# Patient Record
Sex: FEMALE | Race: WHITE | URBAN - METROPOLITAN AREA
[De-identification: names, ages, dates, MRNs, and addresses within clinical notes are randomized per-mention and may not be internally consistent; named-entity substitution may affect disease eponyms.]

---

## 2023-02-28 ENCOUNTER — HOME HEALTH ADMISSION (OUTPATIENT)
Dept: HOME HEALTH SERVICES | Facility: HOME HEALTH | Age: 88
End: 2023-02-28
Payer: MEDICARE

## 2023-03-06 ENCOUNTER — HOME CARE VISIT (OUTPATIENT)
Dept: SCHEDULING | Facility: HOME HEALTH | Age: 88
End: 2023-03-06

## 2023-03-06 VITALS
HEART RATE: 85 BPM | RESPIRATION RATE: 18 BRPM | DIASTOLIC BLOOD PRESSURE: 62 MMHG | OXYGEN SATURATION: 93 % | SYSTOLIC BLOOD PRESSURE: 105 MMHG | TEMPERATURE: 97.1 F

## 2023-03-06 PROCEDURE — G0151 HHCP-SERV OF PT,EA 15 MIN: HCPCS

## 2023-03-06 PROCEDURE — 0221000100 HH NO PAY CLAIM PROCEDURE

## 2023-03-06 ASSESSMENT — ENCOUNTER SYMPTOMS
DYSPNEA ACTIVITY LEVEL: AFTER AMBULATING 10 - 20 FT
PAIN LOCATION - PAIN QUALITY: ACHY

## 2023-03-07 ENCOUNTER — HOME CARE VISIT (OUTPATIENT)
Dept: SCHEDULING | Facility: HOME HEALTH | Age: 88
End: 2023-03-07

## 2023-03-07 PROCEDURE — G0152 HHCP-SERV OF OT,EA 15 MIN: HCPCS

## 2023-03-07 NOTE — HOME HEALTH
Pt is an 81 yo female who is a new resident at [facility]. Pt recently moved down from [place] about 2 weeks ago. Skilled PT intervention orders received. PMH includes Falls, HTN, GERD, RA, OA, insomnia. Pt was living in an [place] in [place] and recently moved approximately 2 weeks ago to [facility]. Pt's son and daughter n law live close by and are available to assist as needed. Pt was using a RW for short distances and transport wc for all mobility outside of pt's room. Pt presents now using a wc for all mobility, gneralized joint pain especially L shoulder, unsteady gait pattern, BLE muscle weakness, impaired dynamic balance and decreased cv endurance causing difficulty performing bed mobility, safe functional transfers from various surfaces throughout home, standing activities, household ambulation and facility ambulation with reduced assistance as able to do in PLOF. Pt is currently using a borrowed standard wc while she awaits hers that has already been ordered per caregiver. Pt will benefit from skilled PT intervention in order to improve functional deficits and establish a HEP to allow pt to perform daily functional tasks including ambulating to/from dining room, safely with supervision using her RW for support. PT reviewed POC, tx frequency, tx goals, safety precautions, fall prevention strategies, pain management, safe med management and energy conservation techniques.

## 2023-03-08 ENCOUNTER — HOME CARE VISIT (OUTPATIENT)
Dept: SCHEDULING | Facility: HOME HEALTH | Age: 88
End: 2023-03-08

## 2023-03-08 VITALS
HEART RATE: 76 BPM | OXYGEN SATURATION: 94 % | RESPIRATION RATE: 18 BRPM | DIASTOLIC BLOOD PRESSURE: 62 MMHG | TEMPERATURE: 97.3 F | SYSTOLIC BLOOD PRESSURE: 140 MMHG

## 2023-03-08 VITALS
HEART RATE: 76 BPM | OXYGEN SATURATION: 97 % | DIASTOLIC BLOOD PRESSURE: 60 MMHG | TEMPERATURE: 97.5 F | RESPIRATION RATE: 16 BRPM | SYSTOLIC BLOOD PRESSURE: 115 MMHG

## 2023-03-08 PROCEDURE — G0157 HHC PT ASSISTANT EA 15: HCPCS

## 2023-03-08 ASSESSMENT — ENCOUNTER SYMPTOMS: SNORING: 1

## 2023-03-08 NOTE — HOME HEALTH
Pt is an 81 yo female R handed, recently moved  from [place] to  [address] [place] [address].   PMH includes Falls, HTN, GERD, RA, OA, insomnia. DME: bed rail, grab bars in bathroom ( toilet and shower), 2 ww, shower chair , transport wc  PLOF : [place], ambulated short dinstance using RW and ling distance using transport wc, some assistance reqired with self care. Patient currently a resident of [address] [place] [address] with supportive family providing support and assistance. Pt reports generalized pain especially on L shoulder with movement above 45 degrees of flexion or abd. Pt presents with decreased BUE ROM limiting ability to reach to cabinets and own body parts. Pt requires asssitance with functional transfers and with self care especially activities in standing position due to poor standing tolerance and fair posture displaying round shoulders and posterior pelvic tilt. Pt presents with impaired [address] and [address] with B hand digits deformities interfering with object manipulation. Pt is alert and Ox3 , forgetful. -- Patient was educated in proper sequencing during bed and toilet transfers. She verbalized understanding, however, she requires continuation of training.   -- Pt could benefit from OT to address the above issues.

## 2023-03-08 NOTE — HOME HEALTH
Pt denies having any pain. In bed and states she is not doing good because she is a little tired. Agreeable to PT. Amb with FWW, CGA, 100ft with 1 turn. Presents with unequal steps, decrease heel strike, decrease step length. Vc given for correction to deviations to improve foot clearance and gait quality. Gait training to improve pt ability to navigate around [place] for meals and activities safely. Rolling side to side with Mod A, VC for reaching cross midline and crossing top LE over to facilitate roll. Supine <> sit with Max A and Max A for progression of BLE on and off bed. VC for rolling side lying before attempting to sit up. 10reps x 2 heel toe raises, hip flexion, LAQ, hip abd with manual resistance, and ham curls to improve muscle strength for transfers and gait training. VC for slow movements and reaching full range of motion. Exercise handout given to pt for HEP; pt verbalized understanding. STS transfer from pt WC with Mod A. VC for hand placement on WC arm rest for push off to prevent pt pulling on walker to get into standing. STS from bed with SBA, Vc for hand placement. Pt presents with muscle weakness with difficulty in completing bed mobility and transfers. Post tx pt reported she is too tired and did not want to do anymore. Will require continued training to improve gait endurance, gait quality, muscle strength, bed mobility, and carryover of each task to reach pt goals. Next session work on bed mobility, gait, strength, and transfers.

## 2023-03-13 ENCOUNTER — HOME CARE VISIT (OUTPATIENT)
Dept: SCHEDULING | Facility: HOME HEALTH | Age: 88
End: 2023-03-13

## 2023-03-13 VITALS
HEART RATE: 77 BPM | DIASTOLIC BLOOD PRESSURE: 65 MMHG | TEMPERATURE: 97.2 F | OXYGEN SATURATION: 93 % | RESPIRATION RATE: 18 BRPM | SYSTOLIC BLOOD PRESSURE: 118 MMHG

## 2023-03-13 VITALS
RESPIRATION RATE: 18 BRPM | HEART RATE: 89 BPM | SYSTOLIC BLOOD PRESSURE: 110 MMHG | OXYGEN SATURATION: 95 % | TEMPERATURE: 97.2 F | DIASTOLIC BLOOD PRESSURE: 62 MMHG

## 2023-03-13 PROCEDURE — G0157 HHC PT ASSISTANT EA 15: HCPCS

## 2023-03-13 PROCEDURE — G0152 HHCP-SERV OF OT,EA 15 MIN: HCPCS

## 2023-03-14 NOTE — HOME HEALTH
Pt denies having any current pain. Transfer training WC <> toilet with Min A, assistance with set-up and locking of WC brakes. VC for hand placement on WC armrest for push off and on handrail for safety when pivoting. Standing balance with shoulder width stance, reaching below pt waist, reaching cross midline with trunk rotations, and reaching behind with focus on dynamic and static stability for fall prevention. Gait training within pt room to improve safety navigating to and from bathroom, kitchen, and to pt bed, making 360 degree turns and navigating through doorways for fall prevention. Presents with unequal and inconsistent steps. requires some assistance for getting over ledge going into kitchen area due to unsteadiness and VC for slower pacing with turns for safety. Bed mobility sit > supine with Mod A and supine > sit with Max A; educated and instructed pt in proper techniques to improve pt independence with going into sidelying before getting in or out of bed. Instructed in proper hand placement while in sidelying to push off with R elbow and L hand to get into sitting and VC for LE progression off of bed. Pt able to make progress within session and progressed to [address] [place] [address] with task. Pt able to make progress within session with bed mobility and decrease in assistance level but will require continued training for carryover. No LOB with balance training and continued reminders for safety with transfers. Next session continue gait training in [address], bed mobility, transfers, and therex training to reach pt goals.

## 2023-03-14 NOTE — HOME HEALTH
Patient watching tv upon OT's arrival, pt reporte no pain at rest and some discomfort in B hand when holding objects. Pt in agreement to participate in functional transfer including toilet transfer with focus on proper sequencing . Pt was instructed in safety when lowering her self using BSC arm rest. Pt able to handle clothing indep  after toileting. Pt reported, she does not like the bed rail placed on the higher part of the bed, she likes it when it was placed on the lower part of the bed. Pt was educated in proper sequencing when getting in bed with focus on sitting close to the bed rail and sliding back using B UE and proper use of bed rail. Pt tolerate gentle UE ROM in supine . OT will continue with POC for imporved BUE ROM and strength with focus on B grasp strength and joint precaution. Pt to remain at the [facility] .

## 2023-03-16 ENCOUNTER — HOME CARE VISIT (OUTPATIENT)
Dept: SCHEDULING | Facility: HOME HEALTH | Age: 88
End: 2023-03-16

## 2023-03-16 VITALS
DIASTOLIC BLOOD PRESSURE: 70 MMHG | OXYGEN SATURATION: 98 % | SYSTOLIC BLOOD PRESSURE: 130 MMHG | RESPIRATION RATE: 18 BRPM | TEMPERATURE: 97.2 F | HEART RATE: 87 BPM

## 2023-03-16 PROCEDURE — G0157 HHC PT ASSISTANT EA 15: HCPCS

## 2023-03-17 NOTE — HOME HEALTH
Pt states she has a headache today 8/10, upon arrival pt laying in bed watching TV. Bed mobility supine <> sit with Max A, instructed in rolling into side lying in between for ease of transfers with pt having difficulty maintaining sidelying as she tries to sit up. Able to Progress BLE off bed and Min A for getting it onto the bed. Instructed in rolling side to side with pt initially stating she can't do it however instructed pt to reach LUE and LE over body when rolling to R side and vice versa. Pt able to follow cues and completed rolling with CGA. Getting in and out of bed into standing is hard for pt as bed is too high and pt begins to slide. Pt amb with FWW, 150ft with CGA. Presents with unequal steps and forward posture. VC for closer proximity to walker to improve posture. 12reps x 2 supine exercises this session as pt reports she is too tired. Instructed in ankle pumps, heel slides, single knee to chest, SAQ, bridging, and SLR. Rest between and VC for proper breathing with task. Pt continues to show difficulty getting in and out of bed requiring Max A this session with little carryover from previous session. Pt is at risk for falls as bed is too high and pt sliding off bed when trying to get into standing. Have notified pt son and spoke to staff regarding pt high bed and need for shorter bed to improve pt safety. Able to some improved gait quality this session with pt able to take bigger steps without cues however still unequal. Next session continue with bed mobility, gait training, and improving pt independence to decrease burden of care on caregivers.

## 2023-03-21 ENCOUNTER — HOME CARE VISIT (OUTPATIENT)
Dept: SCHEDULING | Facility: HOME HEALTH | Age: 88
End: 2023-03-21
Payer: MEDICARE

## 2023-03-21 VITALS
DIASTOLIC BLOOD PRESSURE: 88 MMHG | SYSTOLIC BLOOD PRESSURE: 136 MMHG | RESPIRATION RATE: 16 BRPM | OXYGEN SATURATION: 96 % | HEART RATE: 77 BPM | TEMPERATURE: 97.8 F

## 2023-03-21 PROCEDURE — G0157 HHC PT ASSISTANT EA 15: HCPCS

## 2023-03-21 NOTE — HOME HEALTH
Denies having any current pain. States she got a shorter bed mattress and it its working easier for her now. Bed mobility Supine<>sit with Mod A, VC for rolling to R side and holding bed rail to assist with turning however pt has difficulty with reaching with LLE for bed rail due to decrease in ROM of LUE. Pt able to progress BLE on and off bed without assistance. Sit<> stand from bed with Max A due to bed now being lower than previous. Vc for proper hand placement with RUE on bed rail and LUE on bed, VC for scooting to edge of bed and for foot placement and trunk flexion. Instructed in bridging x 5reps and instructed pt in how to reposition in bed to move up in bed. Instructed in first bridging and then shifting weight on one shoulder while progressing oppsite shoulder up and switching sides. STS from [facility] 5reps with CGA and progressing to SBA. VC for proper hand placement to decrease pulling from walker and for foot placement by increasing knee flexion. Pt did present with fatigue end of task requiring longer time to complete last 2 reps. Pt instructed in amb with FWW, SBA, 150ft x 2 and 1 seated rest. Presents with decrease in hip knee flexion and VC given for correction to deviations to improve foot clearance. Pt able to make progress with decrease in assistance level with STS from [facility] however noted increase difficulty with STS from bed due to lower height now and will require continued training. Able to make progress with gait and tolerate longer distance amb before fatigue. Still presents with difficulty with bed mobility and will require continued training to improve independence. Next session STS transfer from bed and bed mobility.

## 2023-03-22 ENCOUNTER — HOME CARE VISIT (OUTPATIENT)
Dept: SCHEDULING | Facility: HOME HEALTH | Age: 88
End: 2023-03-22
Payer: MEDICARE

## 2023-03-22 VITALS
SYSTOLIC BLOOD PRESSURE: 122 MMHG | HEART RATE: 84 BPM | OXYGEN SATURATION: 97 % | DIASTOLIC BLOOD PRESSURE: 70 MMHG | TEMPERATURE: 97.5 F

## 2023-03-22 PROCEDURE — G0158 HHC OT ASSISTANT EA 15: HCPCS

## 2023-03-22 NOTE — HOME HEALTH
Pt agreeable toTx . Pt heading to bathroom for toilet use . Pt required Mod A with clothing management . AMb with walker , Min A with donning button front sweater. Mirza Cast REquired A to open room door to amb to dining room . Self feeding with regular utensils. A required to open containers. Pt performed AROM with B UEs. Limited shoulder flexion , and abdcution . No pain with AROM . Dynamic sitting with Pt ableto reach to ankles with B UEs with no difficulty . Limited AROM  in (B) hands . Provided with HEP hand out for UEs . Pt will advance toward goals and dc when met WIll reinforce HEP and hand strength . next tx.

## 2023-03-23 ENCOUNTER — HOME CARE VISIT (OUTPATIENT)
Dept: SCHEDULING | Facility: HOME HEALTH | Age: 88
End: 2023-03-23
Payer: MEDICARE

## 2023-03-23 VITALS
TEMPERATURE: 97.2 F | OXYGEN SATURATION: 94 % | HEART RATE: 74 BPM | SYSTOLIC BLOOD PRESSURE: 120 MMHG | DIASTOLIC BLOOD PRESSURE: 80 MMHG | RESPIRATION RATE: 18 BRPM

## 2023-03-23 PROCEDURE — G0157 HHC PT ASSISTANT EA 15: HCPCS

## 2023-03-23 NOTE — HOME HEALTH
Pt denies having any pain. Laying in bed watching TV. States she is cold    Pt amb with FWW and SBA 200ft, and 100ft x 2 with focus on improving pt endurance and ability to safely get to and from dining room and elevators. Required longer seated rest periods between due to reports of fatigue. Presents with occasional decrease in L step length and foot clearance with VC for correction to deviation. Bed mobility Supine <> sit with SBA, pt able to show carryover from previous session with reaching for bed rail and rolling into side lying prior to attempting to sit up. Able to progress BLE on and off bed without assistance however requires increase time and effort to get BLE onto bed. STS transfer from bed x 3reps with CGA and from [facility] x5reps with SBA. 50% VC for hand placement to decrease reaching for walker. Able to show carryover of scooting to edge of bed and WC prior to transfer. Seated therex 15reps x 2 double LAQ, hip flexion, heel toe raises, and hip abd with green band to improve muscle strength for carryover to transfers. VC for reaching full range and slower movement in all direction. Pt is making good progress toward goal evident of decrease in assistance with bed mobility, transfers, and with gait however still requires reminders for proper hand placement for transfers. Able to show carryover from previous session. Next session continue to improve LE muscle strength, transfer training for bed to improve independence, bed mobility, and gait training to reach pt goals.

## 2023-03-28 ENCOUNTER — HOME CARE VISIT (OUTPATIENT)
Dept: HOME HEALTH SERVICES | Facility: HOME HEALTH | Age: 88
End: 2023-03-28
Payer: MEDICARE

## 2023-03-28 ENCOUNTER — HOME CARE VISIT (OUTPATIENT)
Dept: SCHEDULING | Facility: HOME HEALTH | Age: 88
End: 2023-03-28
Payer: MEDICARE

## 2023-03-28 VITALS
DIASTOLIC BLOOD PRESSURE: 72 MMHG | HEART RATE: 94 BPM | SYSTOLIC BLOOD PRESSURE: 118 MMHG | TEMPERATURE: 97.2 F | OXYGEN SATURATION: 96 % | RESPIRATION RATE: 16 BRPM

## 2023-03-28 PROCEDURE — G0157 HHC PT ASSISTANT EA 15: HCPCS

## 2023-03-29 ENCOUNTER — HOME CARE VISIT (OUTPATIENT)
Dept: SCHEDULING | Facility: HOME HEALTH | Age: 88
End: 2023-03-29
Payer: MEDICARE

## 2023-03-29 VITALS
DIASTOLIC BLOOD PRESSURE: 72 MMHG | HEART RATE: 79 BPM | TEMPERATURE: 98 F | OXYGEN SATURATION: 98 % | SYSTOLIC BLOOD PRESSURE: 142 MMHG

## 2023-03-29 PROCEDURE — G0158 HHC OT ASSISTANT EA 15: HCPCS

## 2023-03-29 ASSESSMENT — ENCOUNTER SYMPTOMS: PAIN LOCATION - PAIN QUALITY: ACHE

## 2023-03-29 NOTE — HOME HEALTH
Pt denies having any pain. States she is using walker to get to and from dining room for meals. Sit<> Supine x 3reps with SBA, pt able to progress BLE on and off without assistance however requires a litte extra time for completion. STS from bed required CGA and VC for scooting forward and for trunk flexion; bed is lowered now so pt is having more difficulty with getting in and out of bed. STS from toilet SUP and WC with SBA with VC for proper hand placement on arm rests for pushing into standing. Balance training with pt at bathroom sink, instructed in reaching forward and laterally without support and focus on pt dynamic balance stability. No LOB and able to show good steady balance. Pt amb with FWW 200ft with 2 seated rest and WC follow incase pt needed a rest. Presents with inconsistent steps  Required increase in motivation to amb further as pt continued to want to sit down. Pt has made good progress toward goals. showing a decrease in assistance with transfers and bed mobility however still does require reminders for hand placement. Presents with a cough today and pt did require increase in encouragement for participation with gait training however able to tolerate with seated rests. Pt to see PT next visit for DC.

## 2023-03-29 NOTE — HOME HEALTH
Pt agreeabl etoTx. Asleep on bed , no changes or incidents reported by staff. Pt required Min A wtih supine to sit. SIt to supine indep . Seated unsupported at side of bed for (B) UE AORM , dynamic sitting for core stability , and hand AROM . No c/o pain with UE tasks . Sit to stand from bed required mod A . stand tolernace at bed side with [facility] standing and at sink for simple grooming 3 min . Pt had slight difficluty with oppposition exercises with (R) hand , not beoing abl eto oppose thumb to 2nd digit with initial reps. Improved with reps and concentration . Provided with HEP hand out last tx. reviewed hand out this visit with Pt still requiring cues to complete exercises properly. Pt amb in room with 2 ww with no loss of balnace A requried to open door of room to exit. Pt tolerated Tx well . Cooperative and pleasant. Pt will be seen by OTR for next Tx. to re assess. or Dc .   [place] NonC signed. this tx.

## 2023-03-30 ENCOUNTER — HOME CARE VISIT (OUTPATIENT)
Dept: SCHEDULING | Facility: HOME HEALTH | Age: 88
End: 2023-03-30
Payer: MEDICARE

## 2023-03-30 VITALS
RESPIRATION RATE: 18 BRPM | DIASTOLIC BLOOD PRESSURE: 59 MMHG | SYSTOLIC BLOOD PRESSURE: 121 MMHG | OXYGEN SATURATION: 95 % | TEMPERATURE: 97.9 F | HEART RATE: 67 BPM

## 2023-03-30 PROCEDURE — G0151 HHCP-SERV OF PT,EA 15 MIN: HCPCS

## 2023-03-30 NOTE — HOME HEALTH
Pt has shown good progress toward POC, meeting all goals stated. Pt presents with improved BLE strength scoring a 3+/5 on MMT,  improved dynamic balance scoring a 20/28 on Tinetti test and increased cv endurance scoring 3/10 on modified Prince test allowing her to perform ambulation to/from dining room and including within her room to/from bathroom,  functional transfers from various surfaces and standing activities with reduced difficulty. Pt able to perform all functional tasks safely with SBA while using her RW for support. Pt continues to use primarily use her wc for linger distances. Pt shows improved carryover and understanding to all instructions through the teach back method. PT reviewed HEP, safety precautions, fall prevention techniques, energy conservation techniques, safe med management and proper body mechanics. Pt's family to continue to be very involved in her care and assist as needed. Will dc PT services.

## 2023-04-04 ENCOUNTER — HOME CARE VISIT (OUTPATIENT)
Dept: SCHEDULING | Facility: HOME HEALTH | Age: 88
End: 2023-04-04
Payer: MEDICARE

## 2023-04-04 VITALS
DIASTOLIC BLOOD PRESSURE: 70 MMHG | OXYGEN SATURATION: 96 % | SYSTOLIC BLOOD PRESSURE: 118 MMHG | RESPIRATION RATE: 17 BRPM | HEART RATE: 68 BPM | TEMPERATURE: 97.5 F

## 2023-04-04 PROCEDURE — G0152 HHCP-SERV OF OT,EA 15 MIN: HCPCS

## 2023-04-04 ASSESSMENT — ENCOUNTER SYMPTOMS
PAIN LOCATION - PAIN QUALITY: ACHY, DULL
HEMOPTYSIS: 0

## 2023-11-06 ENCOUNTER — HOME HEALTH ADMISSION (OUTPATIENT)
Dept: HOME HEALTH SERVICES | Facility: HOME HEALTH | Age: 88
End: 2023-11-06
Payer: MEDICARE

## 2023-11-07 ENCOUNTER — HOME CARE VISIT (OUTPATIENT)
Dept: SCHEDULING | Facility: HOME HEALTH | Age: 88
End: 2023-11-07

## 2023-11-07 VITALS
SYSTOLIC BLOOD PRESSURE: 100 MMHG | TEMPERATURE: 97.4 F | OXYGEN SATURATION: 95 % | RESPIRATION RATE: 18 BRPM | HEART RATE: 71 BPM | DIASTOLIC BLOOD PRESSURE: 56 MMHG

## 2023-11-07 PROCEDURE — G0151 HHCP-SERV OF PT,EA 15 MIN: HCPCS

## 2023-11-07 PROCEDURE — 0221000100 HH NO PAY CLAIM PROCEDURE

## 2023-11-07 ASSESSMENT — ENCOUNTER SYMPTOMS
PAIN LOCATION - PAIN QUALITY: ACHY
DYSPNEA ACTIVITY LEVEL: AFTER AMBULATING MORE THAN 20 FT

## 2023-11-08 NOTE — HOME HEALTH
Pt is an 81 yo female who presents with increased weakness affecting her functional mobility. Skilled PT intervention orders received. PMH includes: HTN, GERD, RA, Insomnia. Pt lives at [facility]. Pt was able to ambulate short distances using her RW and used a manual wc for facility and community distances. Pt required assistance from facility staff for ADL's. Pt's son and daughter n law are very involed in her care and are available to assist as needed including driving her to all appointments. Pt presents now showing impaired BLE strength scoring 3/5 affecting her ability to transfer safely without assistance, impaired dynamic balance scoring 16/28 on the Tinetti test and decreased cv endurance scoring 4/10 on the modified Prince scale showing fatigue with exertion. Pt demonstrates difficulty performing safe functional transfers, standing activities, short distance amdbulation and bed mobility with decreased assistance as able to do in PLOF. Pt is now showing difficuty performing OOB transfers and ambulating to/from the dining room. Pt will benefit from skilled PT intervention in order to improve functional deficits and establish a HEP to allow pt to perform daily functional tasks safely with decreased assistance from facility staff incuding ambulating to dining room for all meals using her RW for support. Pt is homebound secondary to requiring assistance from her family to leave her home safely, requires an AD for support due to impaired balance and is at high risk for falls due to muscle weakness. PT reviewed POC, tx frequency, tx goals, safety precautions, fall prevention strategies, safe med management and energy conservation techniques.

## 2023-11-09 ENCOUNTER — HOME CARE VISIT (OUTPATIENT)
Dept: SCHEDULING | Facility: HOME HEALTH | Age: 88
End: 2023-11-09

## 2023-11-09 VITALS
HEART RATE: 67 BPM | RESPIRATION RATE: 18 BRPM | SYSTOLIC BLOOD PRESSURE: 140 MMHG | OXYGEN SATURATION: 93 % | DIASTOLIC BLOOD PRESSURE: 72 MMHG | TEMPERATURE: 97.3 F

## 2023-11-09 VITALS
OXYGEN SATURATION: 95 % | SYSTOLIC BLOOD PRESSURE: 160 MMHG | HEART RATE: 70 BPM | DIASTOLIC BLOOD PRESSURE: 78 MMHG | TEMPERATURE: 97 F | RESPIRATION RATE: 18 BRPM

## 2023-11-09 PROCEDURE — G0152 HHCP-SERV OF OT,EA 15 MIN: HCPCS

## 2023-11-09 PROCEDURE — G0157 HHC PT ASSISTANT EA 15: HCPCS

## 2023-11-09 ASSESSMENT — ENCOUNTER SYMPTOMS: PAIN LOCATION - PAIN QUALITY: ACHE

## 2023-11-09 NOTE — HOME HEALTH
This patient has answered NO to the following questions:   1) have you traveled outside the country   2) have you been exposed to or been in contact with anyone whom traveled outside the country in the past two weeks   3) do you have a sore throat/fever/dry cough      4)The patient has been educated on and demonstrates good understanding via the teach-back method for the following: Signs and symptoms of COVID-19 yes    Pt is an 79 yo female who presents with increased weakness affecting her functional mobility and requiring increased assist from staff for ADLs. [facility] PMH includes: HTN, GERD, RA, Insomnia. Pt demo's decreased bilat UE ROM/strength  making reaching for ADL items difficult and performing ADLs. Pt demo's decreased function of bilat hands with decreased ROM/strength making hodling ADL supplies difficult but able to feed self . Pt demo's decresaed standing tolerance/balance requiring frequent rest periods with ADL's and UE support at all times with increased staff assist required. Pt at risk of falls due to decreased balance/tolerance with ADLs. Pt able to ambulate with walker short distances only with mod assist for safety and VC for managing walker properly. Pt reports generalized arthritic pain with ADLs. Pt would benefit with OT to increase safety and indep with ADL's, IADL's, increase bilat UE strength for functional transfers, adaptive equipment recommendations and training, increasing standing tolerance/balance for safety with ADL's with fewer rest periods. Pt's goal is to have no falls.    Discussed DC plan  with pt and caregiver, plan to DC when goals met

## 2023-11-09 NOTE — HOME HEALTH
Pt denies having any current pain. Pt bed controls are not working but pt states her son is able to make it work. Pt bedrail is all the way up on head of bed which is raised, bed controls are not working and unable to lower head of bed. Upon arrival pt laying in bed all the way toward foot of bed and unable to reach bedrail. Bed rails adjusted and moved lower to allow pt more efficient use. Due to elevation of head pt continues to slide down to bottom half of bed. Transfer training from bed and toilet with SBA and focus on proper techniques for transfers. Min VC given for hand placement with pt able to transfer with more ease from elevated toilet seat due to higher positioning of seat. Seated EOB and instructed pt in therex; 10reps x 2 heel toe raises, hip flexion, hip abd with red band, ham curls with red band, and LAQ with VC for reaching full range and slowing down each movement to allow or muscle activation in all range. Pt was able to self correct posture. Gait training to improve pt safety within her room while navigating door to bathroom and amb to door leading to [address]. Difficulty navigating door leading to [address] because it is a fire door which is heavy and pt requires assistance with door to be able to leave pt room. Pt shows good safety awareness of keeping inside walker at all times even during turns. VC for increasing hip knee flexion to decrease occasional sliding of foot across floor. Pt cooperative and able to complete all tasks without fatigue. Will reassess to see how pt does with changed position of bed rail next session and address any issues then. Plan: transfer training, gait to improve endurance for ability to get to dining room and back safely, and therex to improve muscle strength. DC when goals met.

## 2023-11-13 ENCOUNTER — HOME CARE VISIT (OUTPATIENT)
Dept: SCHEDULING | Facility: HOME HEALTH | Age: 88
End: 2023-11-13

## 2023-11-13 VITALS
SYSTOLIC BLOOD PRESSURE: 150 MMHG | OXYGEN SATURATION: 95 % | DIASTOLIC BLOOD PRESSURE: 64 MMHG | RESPIRATION RATE: 16 BRPM | TEMPERATURE: 97.3 F | HEART RATE: 72 BPM

## 2023-11-13 VITALS
SYSTOLIC BLOOD PRESSURE: 170 MMHG | DIASTOLIC BLOOD PRESSURE: 72 MMHG | TEMPERATURE: 97 F | OXYGEN SATURATION: 99 % | HEART RATE: 68 BPM | RESPIRATION RATE: 18 BRPM

## 2023-11-13 PROCEDURE — G0152 HHCP-SERV OF OT,EA 15 MIN: HCPCS

## 2023-11-13 PROCEDURE — G0157 HHC PT ASSISTANT EA 15: HCPCS

## 2023-11-13 ASSESSMENT — ENCOUNTER SYMPTOMS: PAIN LOCATION - PAIN QUALITY: ACHE

## 2023-11-13 NOTE — HOME HEALTH
This patient has answered NO to the following questions:   1) have you traveled outside the country   2) have you been exposed to or been in contact with anyone whom traveled outside the country in the past two weeks   3) do you have a sore throat/fever/dry cough      4)The patient has been educated on and demonstrates good understanding via the teach-back method for the following: Signs and symptoms of COVID-19 yes    No reported changes or incidents since previous visit  Instructed pt in bed mobility with min assist and min VC after teach backmethod to sit up and proper technique, use of bedrail. Instructed in safe mobility to bathroom and toilet transfer, min VC for proper management of walker and placement of same, avoiding stepping over leg of walker to reduce risk of falls. Instructed in sitting balance with unsupported sitting on bed with functional reach in all planes . Instructed in standing balance/tolerance with functional reaching to assist with standing ADLs with less risk of falls. Instructed pt in bilat UE strengthening in unsupported sitting to increase core strength for sitting ADLs, posture exercises and deep breathing. Pt tolerates 10-15 reps to shoulders, elbows,   in unsupported sitting to increase core and trunk control for sitting ADL's. Pt requires mod VC for proper form, breathing, pacing and slowing exercises down after teach back method  Discussed DC plan  with pt and caregiver, plan to DC when goals met  Plan to continue increasing UE strength, ADL's, safety with transfers.   Pt progressing well towards OT goals

## 2023-11-14 NOTE — HOME HEALTH
Pt denies having any pain. Pt was just finishing up playing card games in activities and states she likes to join in on activities when she can. Balance training: instructing pt in reaching cross midline and outside of YANI to reach for cones 10reps x 2 with 1 hand supported on walker. Focus on improving pt balance stability for dynamic tasks and in prevention of falls. Noted some decrease in range of pt reach having difficulties reaching for higher positions. Transfer training from [facility]; x3reps with focus on improving safety with transfers. Pt required CGA-SBA for completion and VC for proper hand placement to push off on WC armrest and for scooting forward in chair prior to standing as pt WC is a little high so pt legs hang off. Redirection for hand placement as pt continues to want to grab to walker which makes it more difficult to stand up. Seated therex 15reps x 2 ankle pumps, ankle circular rotations, double LAQ with VC for controlling movement back down to decrease pt just dropping legs, hip flexion, hip abd with red band, and ham curls with red band. Focus on improving muscle strength for transfers and amb. VC for slower movements and reaching full range with each exercise. Gait training from pt bed to toilet with FWW, SBA, and assistance with opening of bathroom door. Attempted to amb to dining room but as pt stood up and took 2 steps pt expressed having some low back pain and noted pain behaviors. Instructed pt to sit back down and did not amb further. Assisted pt to dining room and left in dining room for dinner. Pt able to tolerate all given tasks. no LOB with balance exercises and able to increase in reps with therex. Pain however did limit pt from further ambulation. Notified nurse and DON regarding pt back pain. Plan: gait training to improve endurance and safety with getting to and from dining room, transfer training, balance, and strength training. Dc when goals met.

## 2023-11-15 ENCOUNTER — HOME CARE VISIT (OUTPATIENT)
Dept: SCHEDULING | Facility: HOME HEALTH | Age: 88
End: 2023-11-15

## 2023-11-15 VITALS
SYSTOLIC BLOOD PRESSURE: 144 MMHG | TEMPERATURE: 97.3 F | DIASTOLIC BLOOD PRESSURE: 56 MMHG | RESPIRATION RATE: 16 BRPM | HEART RATE: 78 BPM | OXYGEN SATURATION: 93 %

## 2023-11-15 PROCEDURE — G0157 HHC PT ASSISTANT EA 15: HCPCS

## 2023-11-16 NOTE — HOME HEALTH
Pt in activities upon arrival playing card games. Agreeable to therapy. Denies having any current pain. Gait training; FWW and SBA with WC follow x 80ft. Presents with short and unequal steps. Vc for correction to deviations. Ttoward middle pt began to amb with faster clyde and required cues to slow down to improve safety for prevention of falls. Transfer training; STS toilet with SBA and transport chair with CGA; VC for hand placement on WC arm rest for push off and focs on improving pt safety and carryover of techniques with task. Balance training: instructed in rotational movements reaching to B sides and toward back with focus on trunk rotational movements while unsupported and shoulder width stance. Instructed in reaching down toward ground on each side and reaching forward with focus on pt ability to retun to midline witout losing balance. Seated therex 15reps x 2 double LAQ, hip flexion, hip abd with red band, and ankle pumps to improve muscle strength for transfers. Once fatigued pt begins to complete each movement quickly and requiring cues for slowing down and focusing on reaching full range in B directions. Pt is making progress toward goals. No pain reports with any given tasks this session. Able to increase reps with therex however muscle fatigues. No LOB with balance task. Plan: incorporate more challenging dynamic balance tasks, gait training to improve indpendence, therex for strength training. DC when goals met.

## 2023-11-20 ENCOUNTER — HOME CARE VISIT (OUTPATIENT)
Dept: SCHEDULING | Facility: HOME HEALTH | Age: 88
End: 2023-11-20

## 2023-11-20 VITALS
HEART RATE: 73 BPM | SYSTOLIC BLOOD PRESSURE: 157 MMHG | DIASTOLIC BLOOD PRESSURE: 60 MMHG | TEMPERATURE: 97 F | OXYGEN SATURATION: 93 % | RESPIRATION RATE: 18 BRPM

## 2023-11-20 PROCEDURE — G0152 HHCP-SERV OF OT,EA 15 MIN: HCPCS

## 2023-11-20 NOTE — HOME HEALTH
This patient has answered NO to the following questions:   1) have you traveled outside the country   2) have you been exposed to or been in contact with anyone whom traveled outside the country in the past two weeks   3) do you have a sore throat/fever/dry cough      4)The patient has been educated on and demonstrates good understanding via the teach-back method for the following: Signs and symptoms of COVID-19 yes    No reported changes or incidents since previous visit  Pt isntructed in lie to sit at EOB requiring min assisst and min VC after teach back method for use of bedrail and rolling technique from side lying. Instructed pt in bilat UE strengthening in unsupported sitting to increase core strength for sitting ADLs, posture exercises and deep breathing. Pt tolerates 10-15 reps to shoulders, elbows,   in unsupported sitting to increase core and trunk control for sitting ADL's. Pt requires min VC for proper form, breathing, pacing and slowing exercises down after teach back method  Instructed in isometrics to compensate for bilat decreased ROM shoulders, tolerates with no pain,   Instructed in safe mobility with walker in/out of room requiring assist to manage heavy door, min VC for staying close to walker and safety with turning . Pt progressing well towards OT goals  Plan to continue increasing UE strength, ADL's, safety with transfers.   Discussed DC plan  with pt and caregiver, plan to DC when goals met

## 2023-11-21 ENCOUNTER — HOME CARE VISIT (OUTPATIENT)
Dept: SCHEDULING | Facility: HOME HEALTH | Age: 88
End: 2023-11-21

## 2023-11-21 VITALS
OXYGEN SATURATION: 96 % | TEMPERATURE: 96.1 F | DIASTOLIC BLOOD PRESSURE: 60 MMHG | RESPIRATION RATE: 16 BRPM | SYSTOLIC BLOOD PRESSURE: 140 MMHG | HEART RATE: 89 BPM

## 2023-11-21 PROCEDURE — G0157 HHC PT ASSISTANT EA 15: HCPCS

## 2023-11-21 NOTE — HOME HEALTH
Pt laying in bed upon arrival but awake and watching TV. Denies having any current pain and agreeable to PT. Supine exercises to improve muscle strength: 20reps ankle pumps, 12reps x 2 heel slides, Single knee to chest, SLR, and bridging for carryover to bed mobility and transfers. Required initiation of movements. VC for proper form and direction of movement for each exercise. short rest periods between each set of exercises. Gait training with FWW, SBA, 50ft x 2 to improve pt safety and endurance for ability to amb to and from dining room for meals. Presents with decrease heel strike and occasionally scuffing of heels. VC for increasing hip knee flexion for foot clearance. Balance training: NBOS and semi- tandem 30sec hold each; unsupported with cervical rotation and and cervical extension. As pt fatigues pt begins for flex posture forward and head foward needing cues for upright posture. Mod lateral swaying with semi-tandem however pt able to stabilize after instructed to activate core and correct posture. Pt continues to show progress toward goals and actively participates with all given tasks. Improving in endurance with gait without fatigue or SOB and showing good balance stability. Min unsteadiness with semi-tandem stance however no LOB. Plan: Gait to improve endurance and safety to Supervision status, strength training to improve muscle performance, and balance training for prevention of falls. DC when goals met.

## 2023-11-24 ENCOUNTER — HOME CARE VISIT (OUTPATIENT)
Dept: SCHEDULING | Facility: HOME HEALTH | Age: 88
End: 2023-11-24

## 2023-11-24 VITALS
RESPIRATION RATE: 18 BRPM | TEMPERATURE: 97.7 F | HEART RATE: 61 BPM | OXYGEN SATURATION: 96 % | DIASTOLIC BLOOD PRESSURE: 58 MMHG | SYSTOLIC BLOOD PRESSURE: 146 MMHG

## 2023-11-24 PROCEDURE — G0157 HHC PT ASSISTANT EA 15: HCPCS

## 2023-11-24 ASSESSMENT — ENCOUNTER SYMPTOMS: PAIN LOCATION - PAIN QUALITY: ACHE

## 2023-11-24 NOTE — HOME HEALTH
Pt states she is having some itchiness and pain in RLE. 7/10 that just started today. Pt itching on anterior ankle area and presents with dryness. Assisted pt in applying lotion in which pt reports relief. Supine exercises 20reps ankle pumps, double SLR, single knee to chest, heel slides, and bridinging to improve muscle strength for carryover to bed mobility and transfers. VC for slow movement and reaching full range. Occasionally requiring initiation of movement for correct exercise. Gait training with FWW and SBA; 200ft with pt presenting with unequal steps, inconsistent clyde, and occasionally scuffing of R heel. VC for picking up BLE for foot clearance and increasing step length to equallize steps. Pt occasionally will speed up clyde and required cues for slow consistent pacing to prevent fatigue and improve safety. STS transfer from pt bed with MI 5reps x 2 with focus on proper techniques and safety with task. Did not require additional cues other than for full complete standing and upright posture with task. Pt is making good progress toward goals and able to tolerate increase in reps with therex. Shows safety with transfers without need for assistance. Did not express any increase in BLE pain throughout session. Still showing some gait deviations which decreases pt safety. Pt to see PT next visit.

## 2023-11-28 ENCOUNTER — HOME CARE VISIT (OUTPATIENT)
Dept: SCHEDULING | Facility: HOME HEALTH | Age: 88
End: 2023-11-28

## 2023-11-28 VITALS
HEART RATE: 72 BPM | DIASTOLIC BLOOD PRESSURE: 57 MMHG | SYSTOLIC BLOOD PRESSURE: 125 MMHG | TEMPERATURE: 98.2 F | RESPIRATION RATE: 18 BRPM

## 2023-11-28 VITALS
SYSTOLIC BLOOD PRESSURE: 150 MMHG | RESPIRATION RATE: 18 BRPM | HEART RATE: 78 BPM | DIASTOLIC BLOOD PRESSURE: 67 MMHG | OXYGEN SATURATION: 94 % | TEMPERATURE: 97.3 F

## 2023-11-28 PROCEDURE — G0152 HHCP-SERV OF OT,EA 15 MIN: HCPCS

## 2023-11-28 PROCEDURE — G0151 HHCP-SERV OF PT,EA 15 MIN: HCPCS

## 2023-11-28 ASSESSMENT — ENCOUNTER SYMPTOMS: PAIN LOCATION - PAIN QUALITY: ACHY

## 2023-11-28 NOTE — HOME HEALTH
This patient has answered NO to the following questions:   1) have you traveled outside the country   2) have you been exposed to or been in contact with anyone whom traveled outside the country in the past two weeks   3) do you have a sore throat/fever/dry cough      4)The patient has been educated on and demonstrates good understanding via the teach-back method for the following: Signs and symptoms of COVID-19 yes    No reported changes or incidents since previous visit  Instructed pt in safe mobility in home with walker, SBA for transfers for safety and min VC after teach back method for proper placement of walker and staying behind it, avoiding tripping over wheel when stepping over it. Instructed pt in bilat UE strengthening in unsupported sitting to increase core strength for sitting ADLs, posture exercises and deep breathing. Pt tolerates 10-15 reps to shoulders, elbows,   in unsupported sitting to increase core and trunk control for sitting ADL's. Pt requires min VC for proper form, breathing, pacing and slowing exercises down after teach back method  Instred in postrue exercises in unsupported sitting at EOB with no LOB, functinoal reaching in standing with walker with one hand at all times for support . Instructed in standing tolerance/balance for grooming at sink. Plan to continue increasing UE strength, ADL's, safety with transfers.   Pt progressing well towards OT goals  Discussed DC plan  with pt and caregiver, plan to DC when goals met

## 2023-11-28 NOTE — HOME HEALTH
Pt demonstrates steady progress toward POC since initial eval. Pt presents with improving dynamic balance, activity tolerance and BLE strength allowing her to perform sit-stand transfers from various surfaces and ambulation within her room and to/from the dining room with decreased difficulty and assistance needed, using her RW for support. Pt continues to show good participation with tx but shows inconsistent carryover with HEP due to cognitive deficits. Pt's family continues to be very involved in her care and show good carryover with all instructions. PT will continue with current POC to allow pt to reach her PLOF.

## 2023-12-05 ENCOUNTER — HOME CARE VISIT (OUTPATIENT)
Dept: SCHEDULING | Facility: HOME HEALTH | Age: 88
End: 2023-12-05
Payer: MEDICARE

## 2023-12-05 VITALS
HEART RATE: 78 BPM | TEMPERATURE: 97.4 F | RESPIRATION RATE: 18 BRPM | SYSTOLIC BLOOD PRESSURE: 127 MMHG | OXYGEN SATURATION: 9 % | DIASTOLIC BLOOD PRESSURE: 57 MMHG

## 2023-12-05 PROCEDURE — G0152 HHCP-SERV OF OT,EA 15 MIN: HCPCS

## 2023-12-05 NOTE — HOME HEALTH
This patient has answered NO to the following questions:   1) have you traveled outside the country   2) have you been exposed to or been in contact with anyone whom traveled outside the country in the past two weeks   3) do you have a sore throat/fever/dry cough      4)The patient has been educated on and demonstrates good understanding via the teach-back method for the following: Signs and symptoms of COVID-19 yes    No reported changes or incidents since previous visit  Pt seen for OT DC today. Pt insructed in bilat UE strenghening, posture exercises, core strengthening and trunk control to assist with ADL's, transfers.  Instructed pt in energy conservation , body mechanics, work simplification with ADL's to reduce fatigue and risk for falls, adaptive equipment recommendations and training  Pt indep with HEP and met all goals, receptive to OT DC.

## 2023-12-07 ENCOUNTER — HOME CARE VISIT (OUTPATIENT)
Dept: SCHEDULING | Facility: HOME HEALTH | Age: 88
End: 2023-12-07
Payer: MEDICARE

## 2023-12-07 VITALS
RESPIRATION RATE: 18 BRPM | OXYGEN SATURATION: 96 % | TEMPERATURE: 97.5 F | SYSTOLIC BLOOD PRESSURE: 140 MMHG | DIASTOLIC BLOOD PRESSURE: 60 MMHG | HEART RATE: 71 BPM

## 2023-12-07 PROCEDURE — G0157 HHC PT ASSISTANT EA 15: HCPCS

## 2023-12-07 NOTE — HOME HEALTH
Pt reports she did not have a fall and she does not know why people wanted to take her to the hospital becuse she did not fall. Pt continues to use the walker to go to dining room for all her meals. Denies having any pain. Pt laying in bed upon arrival and needing a few minutes to get up to sit EOB. Gait training with FWW and SBA- SUP 200ft making 1 full turn. Shows good safety awareness and good pacing. Did not require any additional cues   Balance training; instructed pt in [address], reaching BUE forward and laterally side to side, completing cervical rotation and extension, and in static stance perturbations applied in varying directions with pt instructed to stabilize. Able to maintain good stability. Standing 15reps x 2 mini squats with VC for proper hip hinging, 10reps hip flexion and pt unable to continue due to reports of R knee pain which decreased when pt sat down, and heel raises to improve muscle strength for carryover to transfers and gait training. Pt has made good progress toward goals and have met most goals this date. Pt able to amb with decrease assistance and decrease in cues required for safety and improved stability. Pt to see PT next visit.

## 2023-12-13 ENCOUNTER — HOME CARE VISIT (OUTPATIENT)
Dept: SCHEDULING | Facility: HOME HEALTH | Age: 88
End: 2023-12-13
Payer: MEDICARE

## 2023-12-13 VITALS
RESPIRATION RATE: 20 BRPM | TEMPERATURE: 97.8 F | OXYGEN SATURATION: 94 % | DIASTOLIC BLOOD PRESSURE: 60 MMHG | HEART RATE: 62 BPM | SYSTOLIC BLOOD PRESSURE: 121 MMHG

## 2023-12-13 PROCEDURE — G0151 HHCP-SERV OF PT,EA 15 MIN: HCPCS

## 2023-12-13 NOTE — HOME HEALTH
Pt has shown good progress toward POC, meeting all goals stated. Pt presents with improved BLE strength scoring a 3+/5 on MMT,  improved dynamic balance scoring a 20/28 on Tinetti test and increased cv endurance scoring 3/10 on modified Prince test allowing her to perform short distance facility ambulation including within her room/bathroom, functional transfers from various surfaces and standing activities with decreased difficulty. Pt able to perform all functional tasks safely with supervision while using her RW for support. Pt does continue to use her wc for all community doistances or outings with her family. Pt shows inconsistent understanding and carryoover to all instructions through the teach back method and continues to require encouragement to increased her participation daily especially during ADL's. PT reviewed HEP, safety precautions, fall prevention techniques, energy conservation techniques, safe med management and proper body mechanics. Pt has reached max potential with skilled PT services, will dc PT services.